# Patient Record
Sex: FEMALE | Race: WHITE | NOT HISPANIC OR LATINO | Employment: UNEMPLOYED | URBAN - NONMETROPOLITAN AREA
[De-identification: names, ages, dates, MRNs, and addresses within clinical notes are randomized per-mention and may not be internally consistent; named-entity substitution may affect disease eponyms.]

---

## 2018-04-23 ENCOUNTER — TRANSCRIBE ORDERS (OUTPATIENT)
Dept: CARDIOLOGY | Facility: CLINIC | Age: 68
End: 2018-04-23

## 2018-04-23 DIAGNOSIS — R06.00 DYSPNEA, UNSPECIFIED TYPE: ICD-10-CM

## 2018-04-23 DIAGNOSIS — R07.89 ATYPICAL CHEST PAIN: Primary | ICD-10-CM

## 2018-05-01 ENCOUNTER — CONSULT (OUTPATIENT)
Dept: CARDIOLOGY | Facility: CLINIC | Age: 68
End: 2018-05-01

## 2018-05-01 VITALS
BODY MASS INDEX: 28.16 KG/M2 | HEIGHT: 65 IN | HEART RATE: 60 BPM | SYSTOLIC BLOOD PRESSURE: 148 MMHG | WEIGHT: 169 LBS | DIASTOLIC BLOOD PRESSURE: 88 MMHG

## 2018-05-01 DIAGNOSIS — R07.89 CHEST TIGHTNESS: Primary | ICD-10-CM

## 2018-05-01 DIAGNOSIS — E78.00 HYPERCHOLESTEREMIA: ICD-10-CM

## 2018-05-01 DIAGNOSIS — I10 ESSENTIAL HYPERTENSION: ICD-10-CM

## 2018-05-01 DIAGNOSIS — E88.81 METABOLIC SYNDROME: ICD-10-CM

## 2018-05-01 DIAGNOSIS — R01.1 MURMUR, CARDIAC: ICD-10-CM

## 2018-05-01 DIAGNOSIS — R06.02 SHORTNESS OF BREATH: ICD-10-CM

## 2018-05-01 PROBLEM — E88.810 METABOLIC SYNDROME: Status: ACTIVE | Noted: 2018-05-01

## 2018-05-01 PROCEDURE — 99204 OFFICE O/P NEW MOD 45 MIN: CPT | Performed by: INTERNAL MEDICINE

## 2018-05-01 PROCEDURE — 93000 ELECTROCARDIOGRAM COMPLETE: CPT | Performed by: INTERNAL MEDICINE

## 2018-05-01 RX ORDER — MULTIPLE VITAMINS W/ MINERALS TAB 9MG-400MCG
1 TAB ORAL DAILY
COMMUNITY

## 2018-05-01 RX ORDER — RANITIDINE 300 MG/1
300 TABLET ORAL NIGHTLY
Qty: 30 TABLET | Refills: 8 | Status: SHIPPED | OUTPATIENT
Start: 2018-05-01 | End: 2018-11-29 | Stop reason: ALTCHOICE

## 2018-05-01 RX ORDER — ISOSORBIDE MONONITRATE 30 MG/1
30 TABLET, EXTENDED RELEASE ORAL DAILY
COMMUNITY
End: 2018-05-30 | Stop reason: ALTCHOICE

## 2018-05-01 RX ORDER — PRAVASTATIN SODIUM 80 MG/1
40 TABLET ORAL DAILY
COMMUNITY
End: 2018-11-29 | Stop reason: SINTOL

## 2018-05-01 RX ORDER — ATENOLOL 50 MG/1
50 TABLET ORAL DAILY
COMMUNITY
End: 2018-05-07 | Stop reason: ALTCHOICE

## 2018-05-01 RX ORDER — LISINOPRIL 10 MG/1
10 TABLET ORAL DAILY
COMMUNITY
End: 2019-05-30 | Stop reason: DRUGHIGH

## 2018-05-01 RX ORDER — HYDROCHLOROTHIAZIDE 25 MG/1
25 TABLET ORAL DAILY
COMMUNITY
End: 2018-05-30

## 2018-05-01 RX ORDER — ASPIRIN 81 MG/1
81 TABLET ORAL DAILY
COMMUNITY

## 2018-05-01 NOTE — PROGRESS NOTES
CARDIAC COMPLAINTS  chest pressure/discomfort and dyspnea      Subjective   Tracy Kamara is a 67 y.o. female came in today for a full initial cardiac evaluation.  She has history of hypertension, hypercholesterolemia who also has a strong family history of ischemic heart disease.  She has been having symptoms of chest tightness and shortness of breath for few years.  About 2 years ago she was hiking for about 2 miles when she started feeling nausea, headache and dizziness.  Apparently the symptoms were so severe that the EMS were called in.  Apparently the heart rate and blood pressure was normal.  She started feeling better so she didn't go to the emergency room.  She was seen at your office about 2 days later and workup at that time included carotid ultrasound which was normal.  She had another episode again when she was hiking and this time it was mostly chest tightness.  Now for the last few months she started noticing the chest tightness occurring on moderate exertion itself and during that time she also gets shortness of breath.  It is not associated with any diaphoresis.  It is not associated with any dizziness or loss of consciousness.  About 3 days ago Imdur was started and the symptoms did get better, but she is not able to tolerate the 30 mg secondary to the headache and takes only 15 mg a day.  Her lab work showed her cholesterol is still elevated at 224 with the LDL of 136.  She is not able to tolerate most of the statins other than protocol only at 40 mg.  She also has been gaining weight secondary to not able to exercise.  She used to be a smoker who quit many years ago.        Past Surgical History:   Procedure Laterality Date   • US CAROTID UNILATERAL  11/10/2015    @ [facility]. Normal Study       Current Outpatient Prescriptions   Medication Sig Dispense Refill   • aspirin 81 MG EC tablet Take 81 mg by mouth Daily.     • atenolol (TENORMIN) 50 MG tablet Take 50 mg by mouth Daily.     •  hydrochlorothiazide (HYDRODIURIL) 25 MG tablet Take 25 mg by mouth Daily.     • isosorbide mononitrate (IMDUR) 30 MG 24 hr tablet Take 30 mg by mouth Daily.     • lisinopril (PRINIVIL,ZESTRIL) 10 MG tablet Take 10 mg by mouth Daily.     • Multiple Vitamins-Minerals (MULTIVITAMIN WITH MINERALS) tablet tablet Take 1 tablet by mouth Daily.     • pravastatin (PRAVACHOL) 80 MG tablet Take 40 mg by mouth Daily.     • raNITIdine (ZANTAC) 300 MG tablet Take 1 tablet by mouth Every Night. 30 tablet 8     No current facility-administered medications for this visit.            ALLERGIES:  Tape    Past Medical History:   Diagnosis Date   • Asthma     Was told she has had a child   • History of hysterectomy    • Hx of cholecystectomy    • Hx of tonsillectomy    • Hyperlipidemia    • Hypertension        History   Smoking Status   • Former Smoker   • Quit date: 5/1/1992   Smokeless Tobacco   • Never Used          Family History   Problem Relation Age of Onset   • Hypertension Mother    • Diabetes Mother    • Heart attack Father    • Hyperlipidemia Father    • Hypertension Father        Review of Systems   Constitution: Positive for malaise/fatigue. Negative for decreased appetite.   HENT: Negative for congestion and sore throat.    Eyes: Negative for blurred vision.   Cardiovascular: Positive for chest pain and dyspnea on exertion.   Respiratory: Positive for shortness of breath. Negative for snoring.    Endocrine: Negative for cold intolerance and heat intolerance.   Hematologic/Lymphatic: Negative for adenopathy. Does not bruise/bleed easily.   Skin: Negative for itching, nail changes and skin cancer.   Musculoskeletal: Negative for arthritis and myalgias.   Gastrointestinal: Negative for abdominal pain, dysphagia and heartburn.   Genitourinary: Negative for bladder incontinence and frequency.   Neurological: Negative for dizziness, light-headedness, seizures and vertigo.   Psychiatric/Behavioral: Negative for altered mental  "status.   Allergic/Immunologic: Negative for environmental allergies and hives.       Diabetes- No  Thyroid- normal    Objective     /88 (BP Location: Right arm)   Pulse 60   Ht 165.1 cm (65\")   Wt 76.7 kg (169 lb)   BMI 28.12 kg/m²     Physical Exam   Constitutional: She is oriented to person, place, and time. She appears well-developed and well-nourished.   HENT:   Head: Normocephalic.   Nose: Nose normal.   Eyes: EOM are normal. Pupils are equal, round, and reactive to light.   Neck: Normal range of motion. Neck supple.   Cardiovascular: Normal rate, regular rhythm, S1 normal and S2 normal.    Murmur heard.  Pulmonary/Chest: Effort normal and breath sounds normal.   Abdominal: Soft. Bowel sounds are normal.   Musculoskeletal: Normal range of motion. She exhibits no edema.   Neurological: She is alert and oriented to person, place, and time.   Skin: Skin is warm and dry.   Psychiatric: She has a normal mood and affect.         ECG 12 Lead  Date/Time: 5/1/2018 1:00 PM  Performed by: CROW MOE  Authorized by: CROW MOE   Previous ECG: no previous ECG available  Rhythm: sinus rhythm  Rate: normal  QRS axis: normal  Clinical impression: non-specific ECG              Assessment/Plan     Tracy was seen today for establish care.    Diagnoses and all orders for this visit:    Chest tightness  -     Stress Test With Myocardial Perfusion One Day; Future  -     raNITIdine (ZANTAC) 300 MG tablet; Take 1 tablet by mouth Every Night.    Essential hypertension    Hypercholesteremia  -     Stress Test With Myocardial Perfusion One Day; Future    Shortness of breath  -     Adult Transthoracic Echo Complete W/ Cont if Necessary Per Protocol; Future    Metabolic syndrome    Murmur, cardiac  -     Adult Transthoracic Echo Complete W/ Cont if Necessary Per Protocol; Future       At baseline her heart rate is stable.  Blood pressure is slightly elevated.  Her BMI is around 28.  Her EKG showed normal " sinus rhythm with nonspecific ST-T changes.  Her clinical examination reveals slightly loud second heart sound and short systolic murmur at the mitral area.  Some of her symptoms does appears to be cardiac in nature.  I scheduled her to undergo a stress test to evaluate the functional status and also for ischemia.  I also scheduled her to undergo an echocardiogram to evaluate the LV function and the PA pressure.  She does have history of GERD and sometimes she feels the heart burn and the chest tightness were difficult to distinguish.  I started her on Zantac 300 milligram once a day for the GERD.  If her stress test shows evidence of ischemia, then she needs to undergo cardiac catheterization and I also advised her that she may need to be on additional medications to help reduce to cholesterol.  Continue her low dose of aspirin at this time.  Based on the results of these test further recommendations will be made.  I also talked to her about diet and weight reduction.  If the stress test and the LV function by echo cardiac murmur normal, need to consider bronchial asthma also.  She does have history of asthma in the past.             Electronically signed by Randall Molina MD May 1, 2018 1:00 PM

## 2018-05-01 NOTE — PROGRESS NOTES
Patient's Body mass index is 28.12 kg/m². BMI is above normal parameters. Follow-up plan includes:  exercise counseling and nutrition counseling.

## 2018-05-03 ENCOUNTER — HOSPITAL ENCOUNTER (OUTPATIENT)
Dept: CARDIOLOGY | Facility: HOSPITAL | Age: 68
Discharge: HOME | End: 2018-05-03
Attending: INTERNAL MEDICINE

## 2018-05-03 ENCOUNTER — OUTSIDE FACILITY SERVICE (OUTPATIENT)
Dept: CARDIOLOGY | Facility: CLINIC | Age: 68
End: 2018-05-03

## 2018-05-03 DIAGNOSIS — E78.00 HYPERCHOLESTEREMIA: ICD-10-CM

## 2018-05-03 DIAGNOSIS — R07.89 CHEST TIGHTNESS: ICD-10-CM

## 2018-05-03 DIAGNOSIS — R06.02 SHORTNESS OF BREATH: ICD-10-CM

## 2018-05-03 DIAGNOSIS — R01.1 MURMUR, CARDIAC: ICD-10-CM

## 2018-05-03 LAB
MAXIMAL PREDICTED HEART RATE: 151 BPM
MAXIMAL PREDICTED HEART RATE: 151 BPM
STRESS TARGET HR: 128 BPM
STRESS TARGET HR: 128 BPM

## 2018-05-03 PROCEDURE — 78452 HT MUSCLE IMAGE SPECT MULT: CPT

## 2018-05-03 PROCEDURE — 93306 TTE W/DOPPLER COMPLETE: CPT

## 2018-05-03 PROCEDURE — A9500 TC99M SESTAMIBI: HCPCS | Performed by: INTERNAL MEDICINE

## 2018-05-03 PROCEDURE — 93018 CV STRESS TEST I&R ONLY: CPT | Performed by: INTERNAL MEDICINE

## 2018-05-03 PROCEDURE — 0 TECHNETIUM SESTAMIBI: Performed by: INTERNAL MEDICINE

## 2018-05-03 PROCEDURE — 93306 TTE W/DOPPLER COMPLETE: CPT | Performed by: INTERNAL MEDICINE

## 2018-05-03 PROCEDURE — 78452 HT MUSCLE IMAGE SPECT MULT: CPT | Performed by: INTERNAL MEDICINE

## 2018-05-03 PROCEDURE — 93017 CV STRESS TEST TRACING ONLY: CPT

## 2018-05-03 RX ADMIN — TECHNETIUM TC 99M SESTAMIBI 1 DOSE: 1 INJECTION INTRAVENOUS at 14:15

## 2018-05-07 ENCOUNTER — TELEPHONE (OUTPATIENT)
Dept: CARDIOLOGY | Facility: CLINIC | Age: 68
End: 2018-05-07

## 2018-05-07 RX ORDER — CARVEDILOL 12.5 MG/1
12.5 TABLET ORAL 2 TIMES DAILY
Qty: 60 TABLET | Refills: 0 | Status: SHIPPED | OUTPATIENT
Start: 2018-05-07 | End: 2018-05-30 | Stop reason: SDUPTHER

## 2018-05-07 NOTE — TELEPHONE ENCOUNTER
Patient aware of stress test and echo results.  Change Atenolol to Coreg 12.5 mg BID.  Patient preferred to send a 30 day script to [facility] and at her 5/30/18 follow up, if doing well then will request a mail order script.

## 2018-05-30 ENCOUNTER — TELEPHONE (OUTPATIENT)
Dept: CARDIOLOGY | Facility: CLINIC | Age: 68
End: 2018-05-30

## 2018-05-30 ENCOUNTER — OFFICE VISIT (OUTPATIENT)
Dept: CARDIOLOGY | Facility: CLINIC | Age: 68
End: 2018-05-30

## 2018-05-30 VITALS
WEIGHT: 171.5 LBS | HEIGHT: 65 IN | DIASTOLIC BLOOD PRESSURE: 100 MMHG | SYSTOLIC BLOOD PRESSURE: 162 MMHG | BODY MASS INDEX: 28.57 KG/M2 | HEART RATE: 57 BPM

## 2018-05-30 DIAGNOSIS — E78.00 HYPERCHOLESTEREMIA: ICD-10-CM

## 2018-05-30 DIAGNOSIS — R07.89 CHEST TIGHTNESS: ICD-10-CM

## 2018-05-30 DIAGNOSIS — E88.81 METABOLIC SYNDROME: ICD-10-CM

## 2018-05-30 DIAGNOSIS — I10 ESSENTIAL HYPERTENSION: Primary | ICD-10-CM

## 2018-05-30 PROCEDURE — 99213 OFFICE O/P EST LOW 20 MIN: CPT | Performed by: NURSE PRACTITIONER

## 2018-05-30 RX ORDER — CARVEDILOL 12.5 MG/1
12.5 TABLET ORAL 2 TIMES DAILY
Qty: 180 TABLET | Refills: 2 | Status: SHIPPED | OUTPATIENT
Start: 2018-05-30 | End: 2018-11-29 | Stop reason: SDUPTHER

## 2018-06-01 ENCOUNTER — TELEPHONE (OUTPATIENT)
Dept: CARDIOLOGY | Facility: CLINIC | Age: 68
End: 2018-06-01

## 2018-06-01 NOTE — TELEPHONE ENCOUNTER
Patient called stating that you had advised her to monitor her BP and call with readings:    · 30th-   · PM:130/77 66  · 31st-  · AM: 158/88 59  · PM: 120/70 71  · 1st-  · AM: 146/86 62    States she takes all daily meds at 9 am and things that she has to take BID she takes evening dose at 9 pm. Any further recommendations?

## 2018-06-01 NOTE — TELEPHONE ENCOUNTER
BP better than what we had at [facility]- 160/100.  Continue the same for now and keep an eye on it.  If consistently elevated, then we will increase lisinopril to 10 mg BID.      Thanks

## 2018-11-29 ENCOUNTER — OFFICE VISIT (OUTPATIENT)
Dept: CARDIOLOGY | Facility: CLINIC | Age: 68
End: 2018-11-29

## 2018-11-29 VITALS
DIASTOLIC BLOOD PRESSURE: 68 MMHG | BODY MASS INDEX: 27.49 KG/M2 | SYSTOLIC BLOOD PRESSURE: 112 MMHG | HEIGHT: 65 IN | HEART RATE: 64 BPM | WEIGHT: 165 LBS

## 2018-11-29 DIAGNOSIS — E78.00 HYPERCHOLESTEREMIA: ICD-10-CM

## 2018-11-29 DIAGNOSIS — I10 ESSENTIAL HYPERTENSION: Primary | ICD-10-CM

## 2018-11-29 DIAGNOSIS — R01.1 MURMUR, CARDIAC: ICD-10-CM

## 2018-11-29 PROCEDURE — 99213 OFFICE O/P EST LOW 20 MIN: CPT | Performed by: NURSE PRACTITIONER

## 2018-11-29 RX ORDER — CARVEDILOL 12.5 MG/1
12.5 TABLET ORAL 2 TIMES DAILY
Qty: 180 TABLET | Refills: 3 | Status: SHIPPED | OUTPATIENT
Start: 2018-11-29 | End: 2019-11-26 | Stop reason: SDUPTHER

## 2019-05-30 ENCOUNTER — OFFICE VISIT (OUTPATIENT)
Dept: CARDIOLOGY | Facility: CLINIC | Age: 69
End: 2019-05-30

## 2019-05-30 VITALS
BODY MASS INDEX: 25.66 KG/M2 | HEIGHT: 65 IN | HEART RATE: 60 BPM | WEIGHT: 154 LBS | DIASTOLIC BLOOD PRESSURE: 80 MMHG | SYSTOLIC BLOOD PRESSURE: 124 MMHG

## 2019-05-30 DIAGNOSIS — E78.00 HYPERCHOLESTEREMIA: ICD-10-CM

## 2019-05-30 DIAGNOSIS — I10 ESSENTIAL HYPERTENSION: Primary | ICD-10-CM

## 2019-05-30 PROCEDURE — 99213 OFFICE O/P EST LOW 20 MIN: CPT | Performed by: NURSE PRACTITIONER

## 2019-05-30 RX ORDER — LISINOPRIL 5 MG/1
5 TABLET ORAL DAILY
Qty: 180 TABLET | Refills: 3 | Status: SHIPPED | OUTPATIENT
Start: 2019-05-30 | End: 2020-09-08 | Stop reason: SDUPTHER

## 2019-05-30 RX ORDER — PRAVASTATIN SODIUM 40 MG
20 TABLET ORAL DAILY
COMMUNITY
End: 2020-10-13 | Stop reason: ALTCHOICE

## 2019-05-30 NOTE — PROGRESS NOTES
Chief Complaint   Patient presents with   • Follow-up     Cardiac management. She reports at times B/P will be low, she will notice when she is feeling tired. She has lost weight, has changed diet to no sugars, breads and chips. Last labs per PCP around first of year.       Subjective       Tracy Kamara is a 68 y.o. female with a history of hypertension, hyperlipidemia, and strong family history of IHD.  She was referred in May 2018 for her initial cardiac evaluation secondary to CP and dizziness. Carotid US was normal. Stress and echo showed normal EF and no ischemia. She did have increased chronotropic response and atenolol changed to Coreg. She has done well. Lipids  in April 2018 were elevated with , , HDL 47, .  She is managed with pravastatin. CMP normal other than glucose 120, CBC normal TSH 2.07, BNP 25. She came today for follow up. She feels well.    She denies any cardiac symptoms.  She is watching her diet and has lost 9 more pounds.  She restricts sugars and starches.  She is hiking often.  Blood pressure has been a little low since she has lost weight.  At times systolic pressure is 80-90.  She plans to have labs done this August at the [facility].     HPI         Cardiac History:    Past Surgical History:   Procedure Laterality Date   • CARDIOVASCULAR STRESS TEST  05/03/2018    6 MIn, 30 Secs. 7.0 METS. 107% THR. BP- 149/70. Breast Attenuation   • ECHO - CONVERTED  05/03/2018    EF 65%. RVSP- 46 mmHg   • US CAROTID UNILATERAL  11/10/2015    @ [facility]. Normal Study       Current Outpatient Medications   Medication Sig Dispense Refill   • aspirin 81 MG EC tablet Take 81 mg by mouth Daily.     • carvedilol (COREG) 12.5 MG tablet Take 1 tablet by mouth 2 (Two) Times a Day. 180 tablet 3   • Multiple Vitamins-Minerals (MULTIVITAMIN WITH MINERALS) tablet tablet Take 1 tablet by mouth Daily.     • pravastatin (PRAVACHOL) 40 MG tablet Take 40 mg by mouth Daily.     • lisinopril  "(PRINIVIL,ZESTRIL) 5 MG tablet Take 1 tablet by mouth Daily. With extra 5 mg as needed for bp >140/90 180 tablet 3     No current facility-administered medications for this visit.        Tape    Past Medical History:   Diagnosis Date   • Asthma     Was told she has had a child   • Diverticulitis    • History of hysterectomy    • Hx of cholecystectomy    • Hx of tonsillectomy    • Hyperlipidemia    • Hypertension        Social History     Socioeconomic History   • Marital status:      Spouse name: Not on file   • Number of children: Not on file   • Years of education: Not on file   • Highest education level: Not on file   Tobacco Use   • Smoking status: Former Smoker     Last attempt to quit: 1992     Years since quittin.0   • Smokeless tobacco: Never Used   Substance and Sexual Activity   • Alcohol use: Yes     Comment: rarely, maybe once a year.   • Drug use: No       Family History   Problem Relation Age of Onset   • Hypertension Mother    • Diabetes Mother    • Heart attack Father    • Hyperlipidemia Father    • Hypertension Father        Review of Systems   Constitution: Positive for weight loss (Decreased 9 pounds in 6 months). Negative for decreased appetite, weakness (Only when BP is low) and malaise/fatigue.   HENT: Negative.    Eyes: Negative.    Cardiovascular: Negative for chest pain, dyspnea on exertion, leg swelling, palpitations and syncope.   Respiratory: Negative for cough and shortness of breath.    Endocrine: Negative.    Hematologic/Lymphatic: Negative.    Skin: Negative.    Musculoskeletal: Negative.    Gastrointestinal: Negative for abdominal pain.   Genitourinary: Negative for dysuria and hematuria.   Neurological: Negative for dizziness.   Psychiatric/Behavioral: Negative for altered mental status and depression.   Allergic/Immunologic: Negative.       Diabetes- No  Thyroid-normal    Objective     /80 (BP Location: Left arm)   Pulse 60   Ht 165.1 cm (65\")   Wt 69.9 kg " (154 lb)   BMI 25.63 kg/m²      Physical Exam   Constitutional: She is oriented to person, place, and time. She appears well-developed and well-nourished. No distress.   HENT:   Head: Normocephalic.   Eyes: Pupils are equal, round, and reactive to light.   Neck: Normal range of motion.   Cardiovascular: Normal rate, regular rhythm and intact distal pulses.   Pulmonary/Chest: Effort normal and breath sounds normal. No respiratory distress.   Abdominal: Soft. Bowel sounds are normal.   Musculoskeletal: Normal range of motion. She exhibits no edema.   Neurological: She is alert and oriented to person, place, and time.   Skin: Skin is warm and dry. She is not diaphoretic.   Psychiatric: She has a normal mood and affect.   Nursing note and vitals reviewed.     Procedures          Assessment/Plan    Heart rate and blood pressure are well controlled.  She has had considerable weight loss and was congratulated on her efforts to maintain a healthy lifestyle.  To prevent hypotension, she is advised to reduce lisinopril to 5 mg daily.  She can take an extra 5 if needed if blood pressure increases to more than 140/90.  She is tolerating pravastatin for hyperlipidemia.  She plans to have labs rechecked this August at the [facility]. Continue carvedilol for increased chronotropic response.  If she continues to exercise and lose weight, carvedilol may be reduced.  She denies cardiac symptoms, no new testing will be ordered.  She appears stable from a cardiac standpoint.  We will see her back in 6 months or sooner if needed.  Tracy was seen today for follow-up.    Diagnoses and all orders for this visit:    Essential hypertension  -     lisinopril (PRINIVIL,ZESTRIL) 5 MG tablet; Take 1 tablet by mouth Daily. With extra 5 mg as needed for bp >140/90    Hypercholesteremia        Patient's Body mass index is 25.63 kg/m². BMI is within normal parameters. No follow-up required..                   Electronically signed by Nya ASHLEY  JET Meyer,  May 31, 2019 12:13 PM

## 2019-11-26 ENCOUNTER — OFFICE VISIT (OUTPATIENT)
Dept: CARDIOLOGY | Facility: CLINIC | Age: 69
End: 2019-11-26

## 2019-11-26 VITALS
HEIGHT: 65 IN | WEIGHT: 152 LBS | BODY MASS INDEX: 25.33 KG/M2 | HEART RATE: 64 BPM | DIASTOLIC BLOOD PRESSURE: 82 MMHG | SYSTOLIC BLOOD PRESSURE: 130 MMHG

## 2019-11-26 DIAGNOSIS — I10 ESSENTIAL HYPERTENSION: Primary | ICD-10-CM

## 2019-11-26 DIAGNOSIS — E78.00 HYPERCHOLESTEREMIA: ICD-10-CM

## 2019-11-26 DIAGNOSIS — R07.89 CHEST TIGHTNESS: ICD-10-CM

## 2019-11-26 DIAGNOSIS — R06.02 SHORTNESS OF BREATH: ICD-10-CM

## 2019-11-26 PROCEDURE — 99213 OFFICE O/P EST LOW 20 MIN: CPT | Performed by: NURSE PRACTITIONER

## 2019-11-26 RX ORDER — CARVEDILOL 12.5 MG/1
12.5 TABLET ORAL 2 TIMES DAILY
Qty: 180 TABLET | Refills: 3 | Status: SHIPPED | OUTPATIENT
Start: 2019-11-26 | End: 2020-09-08 | Stop reason: SDUPTHER

## 2019-11-26 NOTE — PROGRESS NOTES
Chief Complaint   Patient presents with   • Follow-up     Cardiac management. Patient verbalized current medications.   • Blood pressure     She reports stable at home.   • Lab     Has copy of most recent labs.   • Shortness of Breath     She continues to have on exertion, relieved with rest.   • Med Refill     Needs refills on Coreg-90 day.       Subjective       Tracy Kamara is a 69 y.o. female with a history of hypertension, hyperlipidemia, and strong family history of IHD.  She was referred in May 2018 for her initial cardiac evaluation secondary to CP and dizziness. Carotid US was normal. Stress and echo showed normal EF and no ischemia. She did have increased chronotropic response and atenolol changed to Coreg. She has done well. Lipids in April 2018 were elevated with , , HDL 47, . She is managed with pravastatin which has been reduced to 20 mg secondary to myalgia. CMP normal other than glucose 120, CBC normal, TSH 2.07, BNP 25. She came today for follow up. She denies cardiac symptoms, no CP, SOB, or palpitations. She continues to watch her diet and has lost 2 more lb. She has had more problems with IBS/diverticulosis and has found raw vegetables are difficult for her to digest. We reduced lisinopril last visit secondary to lower bp. She is currently taking OTC sinus meds for URI symptoms. Labs brought in today showed LDL remains elevated at 132, HDL 50, Tri 194, , normal CMP, CBC, TSH 2.08.     HPI         Cardiac History:    Past Surgical History:   Procedure Laterality Date   • CARDIOVASCULAR STRESS TEST  05/03/2018    6 MIn, 30 Secs. 7.0 METS. 107% THR. BP- 149/70. Breast Attenuation   • ECHO - CONVERTED  05/03/2018    EF 65%. RVSP- 46 mmHg   • US CAROTID UNILATERAL  11/10/2015    @ [facility]. Normal Study       Current Outpatient Medications   Medication Sig Dispense Refill   • aspirin 81 MG EC tablet Take 81 mg by mouth Daily.     • carvedilol (COREG) 12.5 MG tablet Take 1 tablet  by mouth 2 (Two) Times a Day. 180 tablet 3   • lisinopril (PRINIVIL,ZESTRIL) 5 MG tablet Take 1 tablet by mouth Daily. With extra 5 mg as needed for bp >140/90 180 tablet 3   • Multiple Vitamins-Minerals (MULTIVITAMIN WITH MINERALS) tablet tablet Take 1 tablet by mouth Daily.     • pravastatin (PRAVACHOL) 40 MG tablet Take 20 mg by mouth Daily.       No current facility-administered medications for this visit.      Tape    Past Medical History:   Diagnosis Date   • Asthma     Was told she has had a child   • Diverticulitis    • History of hysterectomy    • Hx of cholecystectomy    • Hx of tonsillectomy    • Hyperlipidemia    • Hypertension      Social History     Socioeconomic History   • Marital status:      Spouse name: Not on file   • Number of children: Not on file   • Years of education: Not on file   • Highest education level: Not on file   Tobacco Use   • Smoking status: Former Smoker     Last attempt to quit: 1992     Years since quittin.5   • Smokeless tobacco: Never Used   Substance and Sexual Activity   • Alcohol use: Yes     Comment: rarely, maybe once a year.   • Drug use: No     Family History   Problem Relation Age of Onset   • Hypertension Mother    • Diabetes Mother    • Heart attack Father    • Hyperlipidemia Father    • Hypertension Father      Review of Systems   Constitution: Negative for decreased appetite, weakness and malaise/fatigue.   HENT: Positive for congestion.    Eyes: Negative.    Cardiovascular: Negative for chest pain, dyspnea on exertion, leg swelling, palpitations and syncope.   Respiratory: Negative for cough and shortness of breath.    Endocrine: Negative.    Skin: Negative.    Musculoskeletal: Negative for back pain and myalgias.   Gastrointestinal: Positive for bloating and diarrhea. Negative for melena.   Genitourinary: Negative for dysuria and hematuria.   Neurological: Negative for dizziness.   Psychiatric/Behavioral: Negative for altered mental status and  "depression.   Allergic/Immunologic: Negative.       Diabetes- No  Thyroid-normal    Objective     /82 (BP Location: Right arm)   Pulse 64   Ht 165.1 cm (65\")   Wt 68.9 kg (152 lb)   BMI 25.29 kg/m²     Physical Exam   Constitutional: She is oriented to person, place, and time. She appears well-developed and well-nourished. No distress.   HENT:   Head: Normocephalic and atraumatic.   Eyes: Pupils are equal, round, and reactive to light.   Neck: Normal range of motion.   Cardiovascular: Normal rate, regular rhythm and intact distal pulses.   Pulmonary/Chest: Breath sounds normal. No respiratory distress. She has no wheezes.   Abdominal: Soft. Bowel sounds are normal.   Musculoskeletal: Normal range of motion. She exhibits no edema.   Neurological: She is alert and oriented to person, place, and time.   Skin: Skin is warm and dry. She is not diaphoretic.   Psychiatric: She has a normal mood and affect.   Nursing note and vitals reviewed.     Procedures          Assessment/Plan      Tracy was seen today for follow-up, blood pressure, lab, shortness of breath and med refill.    Diagnoses and all orders for this visit:    Essential hypertension  -     carvedilol (COREG) 12.5 MG tablet; Take 1 tablet by mouth 2 (Two) Times a Day.    Hypercholesteremia    Shortness of breath    Chest tightness    Heart rate and blood pressure well controlled. Continue carvedilol and lisinopril. We reviewed her lab report.  CBC, CMP, and TSH are normal. LDL and triglycerides remain above goal. She is on maximally tolerated statin at 20 mg pravastatin. Ten year ASCVD risk calculated around 14%. We discussed alternatives for lowering cholesterol including addition of Zetia. She is going to check with her insurance for coverage. Limit saturated fat intake. Limit carbohydrates. Continue regular walking as she can tolerate. She has been treated for plantar fasciitis which has limited her hiking over the summer. Stress test and " echocardiogram reviewed showing no ischemia, normal LV function. She has no new cardiac symptoms. No new orders or testing today. She appears stable. We will see her back in six months or sooner if needed.      Patient's Body mass index is 25.29 kg/m². BMI is within normal parameters. No follow-up required..              Electronically signed by JET Marroquin,  November 28, 2019 6:59 AM

## 2020-07-30 ENCOUNTER — APPOINTMENT (OUTPATIENT)
Dept: WOMENS IMAGING | Facility: HOSPITAL | Age: 70
End: 2020-07-30

## 2020-07-30 PROCEDURE — 77067 SCR MAMMO BI INCL CAD: CPT | Performed by: RADIOLOGY

## 2020-07-30 PROCEDURE — 77063 BREAST TOMOSYNTHESIS BI: CPT | Performed by: RADIOLOGY

## 2020-09-08 ENCOUNTER — TELEPHONE (OUTPATIENT)
Dept: CARDIOLOGY | Facility: CLINIC | Age: 70
End: 2020-09-08

## 2020-09-08 DIAGNOSIS — I10 ESSENTIAL HYPERTENSION: ICD-10-CM

## 2020-09-08 RX ORDER — CARVEDILOL 12.5 MG/1
12.5 TABLET ORAL 2 TIMES DAILY
Qty: 180 TABLET | Refills: 0 | Status: SHIPPED | OUTPATIENT
Start: 2020-09-08 | End: 2020-09-08 | Stop reason: SDUPTHER

## 2020-09-08 RX ORDER — LISINOPRIL 5 MG/1
5 TABLET ORAL DAILY
Qty: 180 TABLET | Refills: 0 | Status: SHIPPED | OUTPATIENT
Start: 2020-09-08 | End: 2020-10-13 | Stop reason: SDUPTHER

## 2020-09-08 RX ORDER — CARVEDILOL 12.5 MG/1
12.5 TABLET ORAL 2 TIMES DAILY
Qty: 180 TABLET | Refills: 0 | Status: SHIPPED | OUTPATIENT
Start: 2020-09-08 | End: 2020-10-13 | Stop reason: SDUPTHER

## 2020-09-08 NOTE — TELEPHONE ENCOUNTER
Script for Coreg 12.5mg bid did not go to pharmacy. 90 day refill on coreg 12.5mg bid sent to pharmacy.

## 2020-09-08 NOTE — TELEPHONE ENCOUNTER
"Patient called stating \"I need refills on Coreg and Lisinopril to be [facility]\". 90 day on coreg 12.5mg bid and lisinopril 5mg daily sent to pharmacy.  "

## 2020-10-13 ENCOUNTER — OFFICE VISIT (OUTPATIENT)
Dept: CARDIOLOGY | Facility: CLINIC | Age: 70
End: 2020-10-13

## 2020-10-13 VITALS
DIASTOLIC BLOOD PRESSURE: 70 MMHG | WEIGHT: 155.4 LBS | SYSTOLIC BLOOD PRESSURE: 120 MMHG | TEMPERATURE: 98 F | HEART RATE: 64 BPM | HEIGHT: 65 IN | BODY MASS INDEX: 25.89 KG/M2

## 2020-10-13 DIAGNOSIS — I10 ESSENTIAL HYPERTENSION: ICD-10-CM

## 2020-10-13 DIAGNOSIS — R06.2 WHEEZE: ICD-10-CM

## 2020-10-13 DIAGNOSIS — R06.02 SHORTNESS OF BREATH: ICD-10-CM

## 2020-10-13 DIAGNOSIS — J30.89 SEASONAL ALLERGIC RHINITIS DUE TO OTHER ALLERGIC TRIGGER: ICD-10-CM

## 2020-10-13 DIAGNOSIS — E78.00 HYPERCHOLESTEREMIA: ICD-10-CM

## 2020-10-13 PROCEDURE — 99213 OFFICE O/P EST LOW 20 MIN: CPT | Performed by: NURSE PRACTITIONER

## 2020-10-13 RX ORDER — CARVEDILOL 12.5 MG/1
12.5 TABLET ORAL 2 TIMES DAILY
Qty: 180 TABLET | Refills: 3 | Status: SHIPPED | OUTPATIENT
Start: 2020-10-13 | End: 2021-04-13 | Stop reason: SDUPTHER

## 2020-10-13 RX ORDER — LISINOPRIL 5 MG/1
5 TABLET ORAL DAILY
Qty: 180 TABLET | Refills: 3 | Status: SHIPPED | OUTPATIENT
Start: 2020-10-13 | End: 2021-04-13 | Stop reason: SDUPTHER

## 2020-10-13 RX ORDER — ALBUTEROL SULFATE 90 UG/1
2 AEROSOL, METERED RESPIRATORY (INHALATION) EVERY 4 HOURS PRN
Qty: 8 G | Refills: 6 | Status: SHIPPED | OUTPATIENT
Start: 2020-10-13 | End: 2023-01-19

## 2020-10-13 NOTE — PROGRESS NOTES
Chief Complaint   Patient presents with   • Follow-up     Cardiac management.   • Lab     Last labs in chart. Has not took Pravastatin for the last 2 months due to needing labs, to see PCP soon for labs and refills.   • Shortness of Breath     Has in the fall, relates to allergies.   • Med Refill     Needs refills on cardiac medications-90 day.       Subjective       Tracy Kamara is a 70 y.o. female with hypertension, hyperlipidemia and strong family history of IHD.She was referred in May 2018 for her initial cardiac evaluation secondary to CP and dizziness. Carotid US was normal. Stress and echo showed normal EF and no ischemia. She did have increased chronotropic response and atenolol changed to Coreg. She has done well.  She developed myalgia with higher dose pravastatin which was reduced to 20 mg.  In 2019, , HDL 50.    She returns today for regular follow-up.  She denies cardiac symptoms, no chest pain or dyspnea on exertion.  She remains active with occasional hiking and has been kayaking this summer.  She continues to have sinus drainage, allergy symptoms.  She does note wheezing often at night.  She has occasional nonproductive cough.  Blood pressure has been well controlled.  She plans to see Dr. Enamorado soon for labs.         Cardiac History:    Past Surgical History:   Procedure Laterality Date   • CARDIOVASCULAR STRESS TEST  05/03/2018    6 MIn, 30 Secs. 7.0 METS. 107% THR. BP- 149/70. Breast Attenuation   • ECHO - CONVERTED  05/03/2018    EF 65%. RVSP- 46 mmHg   • US CAROTID UNILATERAL  11/10/2015    @ [facility]. Normal Study     Current Outpatient Medications   Medication Sig Dispense Refill   • aspirin 81 MG EC tablet Take 81 mg by mouth Daily.     • carvedilol (COREG) 12.5 MG tablet Take 1 tablet by mouth 2 (Two) Times a Day. 180 tablet 3   • lisinopril (PRINIVIL,ZESTRIL) 5 MG tablet Take 1 tablet by mouth Daily. With extra 5 mg as needed for bp >140/90 180 tablet 3   • Multiple Vitamins-Minerals  (MULTIVITAMIN WITH MINERALS) tablet tablet Take 1 tablet by mouth Daily.     • albuterol sulfate  (90 Base) MCG/ACT inhaler Inhale 2 puffs Every 4 (Four) Hours As Needed for Wheezing. 8 g 6     No current facility-administered medications for this visit.      Tape    Past Medical History:   Diagnosis Date   • Asthma     Was told she has had a child   • Diverticulitis    • History of hysterectomy    • Hx of cholecystectomy    • Hx of tonsillectomy    • Hyperlipidemia    • Hypertension      Social History     Socioeconomic History   • Marital status:      Spouse name: Not on file   • Number of children: Not on file   • Years of education: Not on file   • Highest education level: Not on file   Tobacco Use   • Smoking status: Former Smoker     Quit date: 1992     Years since quittin.4   • Smokeless tobacco: Never Used   Substance and Sexual Activity   • Alcohol use: Not Currently     Comment: rarely, maybe once a year.   • Drug use: No     Family History   Problem Relation Age of Onset   • Hypertension Mother    • Diabetes Mother    • Heart attack Father    • Hyperlipidemia Father    • Hypertension Father      Review of Systems   Constitution: Negative for decreased appetite and malaise/fatigue.   HENT: Positive for congestion.    Eyes: Negative for blurred vision.   Cardiovascular: Negative for chest pain, dyspnea on exertion, leg swelling, palpitations and syncope.   Respiratory: Positive for cough and wheezing. Negative for shortness of breath and sleep disturbances due to breathing.    Endocrine: Negative.    Hematologic/Lymphatic: Negative for bleeding problem. Does not bruise/bleed easily.   Skin: Negative.    Musculoskeletal: Negative for falls and myalgias.   Gastrointestinal: Negative for abdominal pain, heartburn and melena.   Genitourinary: Negative for hematuria.   Neurological: Negative for dizziness and light-headedness.   Psychiatric/Behavioral: Negative for altered mental status.  "  Allergic/Immunologic: Negative.       Objective     /70 (BP Location: Right arm)   Pulse 64   Temp 98 °F (36.7 °C)   Ht 165.1 cm (65\")   Wt 70.5 kg (155 lb 6.4 oz)   BMI 25.86 kg/m²     Vitals signs and nursing note reviewed.   Constitutional:       General: Not in acute distress.     Appearance: Well-developed. Not diaphoretic.   Eyes:      Pupils: Pupils are equal, round, and reactive to light.   HENT:      Head: Normocephalic.   Neck:      Musculoskeletal: Normal range of motion.   Pulmonary:      Effort: Pulmonary effort is normal. No respiratory distress.      Breath sounds: Normal breath sounds.   Cardiovascular:      Normal rate. Regular rhythm.   Pulses:     Intact distal pulses.   Abdominal:      General: Bowel sounds are normal.      Palpations: Abdomen is soft.   Musculoskeletal: Normal range of motion.   Skin:     General: Skin is warm and dry.   Neurological:      Mental Status: Alert and oriented to person, place, and time.        Procedures          Problem List Items Addressed This Visit        Cardiovascular and Mediastinum    Essential hypertension    Relevant Medications    lisinopril (PRINIVIL,ZESTRIL) 5 MG tablet    carvedilol (COREG) 12.5 MG tablet    Hypercholesteremia       Respiratory    Shortness of breath    Seasonal allergic rhinitis    Wheeze         1.  HTN-well-controlled with lisinopril and carvedilol.  Continue the same.  Limit sodium.  If the cough and wheeze persist, we may consider changing her antihypertensives to cardioselective beta-blockade as well as changing ACE to ARB.  Patient does feel these are outdoor allergens.    2.  Wheeze- no wheezing at present, patient had recording of nighttime wheezing for which she was given albuterol inhaler to use as needed.  She is advised to discuss this with you.  Flonase and antihistamine OTC is encouraged.    3.  Hyperlipidemia-she is sensitive to statin and has been off pravastatin for the last couple of months.  She plans " to follow-up with you soon for repeat labs.  She is encouraged to take low-dose pravastatin for elevated LDL and strong family history.    We reviewed cardiac work-up which showed no ischemia and normal LV function.  With elevated PA pressure and intermittent wheezing, may need further pulmonary work-up including PFT, +/-CT chest (normal CXR in 2018), sleep apnea work-up.    Patient's Body mass index is 25.86 kg/m². BMI is within normal parameters. No follow-up required..               Electronically signed by JET Marroquin,  October 15, 2020 11:31 EDT

## 2020-10-15 PROBLEM — R06.2 WHEEZE: Status: ACTIVE | Noted: 2020-10-15

## 2020-10-15 PROBLEM — J30.2 SEASONAL ALLERGIC RHINITIS: Status: ACTIVE | Noted: 2020-10-15

## 2021-04-13 ENCOUNTER — OFFICE VISIT (OUTPATIENT)
Dept: CARDIOLOGY | Facility: CLINIC | Age: 71
End: 2021-04-13

## 2021-04-13 VITALS
WEIGHT: 156.6 LBS | HEART RATE: 60 BPM | DIASTOLIC BLOOD PRESSURE: 70 MMHG | SYSTOLIC BLOOD PRESSURE: 124 MMHG | HEIGHT: 65 IN | BODY MASS INDEX: 26.09 KG/M2 | TEMPERATURE: 98.4 F

## 2021-04-13 DIAGNOSIS — I10 ESSENTIAL HYPERTENSION: Primary | ICD-10-CM

## 2021-04-13 DIAGNOSIS — M79.605 PAIN OF LEFT LOWER EXTREMITY: ICD-10-CM

## 2021-04-13 DIAGNOSIS — E78.00 HYPERCHOLESTEREMIA: ICD-10-CM

## 2021-04-13 DIAGNOSIS — R79.89 ELEVATED D-DIMER: ICD-10-CM

## 2021-04-13 DIAGNOSIS — R60.0 LEG EDEMA, LEFT: ICD-10-CM

## 2021-04-13 PROCEDURE — 99214 OFFICE O/P EST MOD 30 MIN: CPT | Performed by: NURSE PRACTITIONER

## 2021-04-13 RX ORDER — LISINOPRIL 5 MG/1
5 TABLET ORAL DAILY
Qty: 180 TABLET | Refills: 3 | Status: SHIPPED | OUTPATIENT
Start: 2021-04-13 | End: 2022-01-26 | Stop reason: ALTCHOICE

## 2021-04-13 RX ORDER — CARVEDILOL 12.5 MG/1
12.5 TABLET ORAL 2 TIMES DAILY
Qty: 180 TABLET | Refills: 3 | Status: SHIPPED | OUTPATIENT
Start: 2021-04-13 | End: 2021-10-28 | Stop reason: SDUPTHER

## 2021-04-13 NOTE — PROGRESS NOTES
Chief Complaint   Patient presents with   • Follow-up     Cardiac management   • Lab     No current labs. Had last covid vaccine on 3/10/21.   • Edema     Having swelling in left leg for the last couple days, noticed pain in left leg today.    • Shortness of Breath     She relates to seasonal allergies.   • Med Refill     Needs refills on Coreg and Lisinopril-90 day.      Subjective       Tracy Kamara is a 70 y.o. female with hypertension, hyperlipidemia and strong family history of IHD.She was referred in May 2018 for her initial cardiac evaluation secondary to CP and dizziness. Carotid US was normal. Stress and echo showed normal EF and no ischemia. She did have increased chronotropic response and atenolol changed to Coreg. She has done well.  She developed myalgia with higher dose pravastatin which was reduced to 20 mg.  In 2019, , HDL 50. She later stopped statin secondary to myalgia.    She returns today for regular follow up. She denies cardiac symptoms. No chest pain, dyspnea on exertion, dizziness, palpitations. She continues to have allergy symptoms but not any worse than usual. Her cc today is left lower extremity swelling and discomfort. Approximately 2-3 days ago, she noticed mild increase in LLE size and indention from her sock. Today her leg feels a little heavy and tight, but not painful. No redness, pitting edema, or heat noted. She denies recent travel, prolonged sitting, surgery or illness. She completed 2 shot series Covid vaccine on 3/10/21. She is looking forward to being more active this spring/summer. She is planning to go camping and hiking this weekend.         Cardiac History:    Past Surgical History:   Procedure Laterality Date   • CARDIOVASCULAR STRESS TEST  05/03/2018    6 MIn, 30 Secs. 7.0 METS. 107% THR. BP- 149/70. Breast Attenuation   • ECHO - CONVERTED  05/03/2018    EF 65%. RVSP- 46 mmHg   • US CAROTID UNILATERAL  11/10/2015    @ [facility]. Normal Study     Current Outpatient  Medications   Medication Sig Dispense Refill   • albuterol sulfate  (90 Base) MCG/ACT inhaler Inhale 2 puffs Every 4 (Four) Hours As Needed for Wheezing. 8 g 6   • aspirin 81 MG EC tablet Take 81 mg by mouth Daily.     • carvedilol (COREG) 12.5 MG tablet Take 1 tablet by mouth 2 (Two) Times a Day. 180 tablet 3   • lisinopril (PRINIVIL,ZESTRIL) 5 MG tablet Take 1 tablet by mouth Daily. With extra 5 mg as needed for bp >140/90 180 tablet 3   • Multiple Vitamins-Minerals (MULTIVITAMIN WITH MINERALS) tablet tablet Take 1 tablet by mouth Daily.       No current facility-administered medications for this visit.     Tape    Past Medical History:   Diagnosis Date   • Asthma     Was told she has had a child   • Diverticulitis    • History of hysterectomy    • Hx of cholecystectomy    • Hx of tonsillectomy    • Hyperlipidemia    • Hypertension      Social History     Socioeconomic History   • Marital status:      Spouse name: Not on file   • Number of children: Not on file   • Years of education: Not on file   • Highest education level: Not on file   Tobacco Use   • Smoking status: Former Smoker     Quit date: 1992     Years since quittin.9   • Smokeless tobacco: Never Used   Vaping Use   • Vaping Use: Never used   Substance and Sexual Activity   • Alcohol use: Not Currently     Comment: rarely, maybe once a year.   • Drug use: No     Family History   Problem Relation Age of Onset   • Hypertension Mother    • Diabetes Mother    • Heart attack Father    • Hyperlipidemia Father    • Hypertension Father      Review of Systems   Constitutional: Positive for weight gain (up 1 lb). Negative for decreased appetite and malaise/fatigue.   HENT: Negative.    Eyes: Negative for blurred vision.   Cardiovascular: Positive for leg swelling. Negative for chest pain, dyspnea on exertion, palpitations and syncope.   Respiratory: Negative for shortness of breath and sleep disturbances due to breathing.    Endocrine:  "Negative.    Hematologic/Lymphatic: Negative for bleeding problem. Does not bruise/bleed easily.   Skin: Negative.    Musculoskeletal: Negative for falls and myalgias.   Gastrointestinal: Negative for abdominal pain, heartburn and melena.   Genitourinary: Negative for hematuria.   Neurological: Negative for dizziness and light-headedness.   Psychiatric/Behavioral: Negative for altered mental status.   Allergic/Immunologic: Positive for environmental allergies.      Objective     /70 (BP Location: Right arm)   Pulse 60   Temp 98.4 °F (36.9 °C)   Ht 165.1 cm (65\")   Wt 71 kg (156 lb 9.6 oz)   BMI 26.06 kg/m²     Vitals and nursing note reviewed.   Constitutional:       General: Not in acute distress.     Appearance: Well-developed. Not diaphoretic.   Eyes:      Pupils: Pupils are equal, round, and reactive to light.   HENT:      Head: Normocephalic.   Pulmonary:      Effort: Pulmonary effort is normal. No respiratory distress.      Breath sounds: Normal breath sounds.   Cardiovascular:      Normal rate. Regular rhythm.      Murmurs: There is a grade 1 to 2/6 systolic murmur at the apex.   Pulses:     Intact distal pulses.   Edema:     Peripheral edema present.     Pretibial: trace edema of the left pretibial area.     Ankle: trace edema of the left ankle.  Abdominal:      General: Bowel sounds are normal.      Palpations: Abdomen is soft.   Musculoskeletal: Normal range of motion.      Cervical back: Normal range of motion. Skin:     General: Skin is warm and dry.   Neurological:      Mental Status: Alert and oriented to person, place, and time.        Procedures          Problem List Items Addressed This Visit        Cardiac and Vasculature    Essential hypertension - Primary    Relevant Medications    lisinopril (PRINIVIL,ZESTRIL) 5 MG tablet    carvedilol (COREG) 12.5 MG tablet    Other Relevant Orders    CBC & Differential    Comprehensive Metabolic Panel    TSH    Hypercholesteremia    Relevant Orders "    CBC & Differential    Comprehensive Metabolic Panel    Lipid Panel    TSH      Other Visit Diagnoses     Leg edema, left        Relevant Orders    CBC & Differential    Comprehensive Metabolic Panel    D-dimer, Quantitative         1. Unilateral LLE swelling- she is low risk for DVT(Wells criteria 1) without acute signs, but obvious increase in LE size compared to RLE. Will check d-dimer. If elevated, US will be ordered. I have asked her to complete this before her camping trip.     2. HTN- well controlled with lisinopril and carvedilol. Continue the same. Refills sent. BMI near normal.     3. Hyperlipidemia- she is statin intolerant. Will recheck her lipids. If significantly elevated, she will be encouraged to resume pravastatin vs Crestor 2 times weekly or if unable to tolerate may try Zetia. She has very strong family history. Her brother recently passed unexpectedly at 81.     4. SOB/wheezing- improved. LCTA. Continue to monitor.     She was given a lab order for CBC, CMP, TSH, F. Lipid, D-dimer. Copy will be forwarded when available. Follow up in six months or sooner as needed.     Patient's Body mass index is 26.06 kg/m². BMI is above normal parameters. Recommendations include: nutrition counseling.              Electronically signed by JET Marroquin,  April 14, 2021 06:15 EDT

## 2021-04-14 ENCOUNTER — TELEPHONE (OUTPATIENT)
Dept: CARDIOLOGY | Facility: CLINIC | Age: 71
End: 2021-04-14

## 2021-04-14 DIAGNOSIS — R79.89 ELEVATED D-DIMER: ICD-10-CM

## 2021-04-14 DIAGNOSIS — M79.605 PAIN OF LEFT LOWER EXTREMITY: ICD-10-CM

## 2021-04-14 DIAGNOSIS — I10 ESSENTIAL HYPERTENSION: ICD-10-CM

## 2021-04-14 DIAGNOSIS — E78.00 HYPERCHOLESTEREMIA: ICD-10-CM

## 2021-04-14 DIAGNOSIS — R60.0 LEG EDEMA, LEFT: Primary | ICD-10-CM

## 2021-04-14 NOTE — TELEPHONE ENCOUNTER
Attempted to notify patient of recommendation for LLE US to rule out DVT, unable to reach and unable to leave message due to no voice mail set up.

## 2021-04-14 NOTE — TELEPHONE ENCOUNTER
Patient made aware of recommendation for venous US of LLE to rule out DVT, patient verbalized understanding. Venous US scheduled at [facility] today at 1:30pm, patient aware.

## 2021-04-20 RX ORDER — PRAVASTATIN SODIUM 40 MG
40 TABLET ORAL DAILY
Qty: 90 TABLET | Refills: 3 | Status: SHIPPED | OUTPATIENT
Start: 2021-04-20 | End: 2023-01-19

## 2021-04-27 ENCOUNTER — TELEPHONE (OUTPATIENT)
Dept: CARDIOLOGY | Facility: CLINIC | Age: 71
End: 2021-04-27

## 2021-10-28 ENCOUNTER — OFFICE VISIT (OUTPATIENT)
Dept: CARDIOLOGY | Facility: CLINIC | Age: 71
End: 2021-10-28

## 2021-10-28 VITALS
WEIGHT: 154.6 LBS | SYSTOLIC BLOOD PRESSURE: 122 MMHG | BODY MASS INDEX: 25.76 KG/M2 | TEMPERATURE: 97.8 F | DIASTOLIC BLOOD PRESSURE: 70 MMHG | HEART RATE: 64 BPM | HEIGHT: 65 IN

## 2021-10-28 DIAGNOSIS — I10 ESSENTIAL HYPERTENSION: Primary | ICD-10-CM

## 2021-10-28 DIAGNOSIS — E78.00 HYPERCHOLESTEREMIA: ICD-10-CM

## 2021-10-28 DIAGNOSIS — R06.02 SHORTNESS OF BREATH: ICD-10-CM

## 2021-10-28 PROCEDURE — 99213 OFFICE O/P EST LOW 20 MIN: CPT | Performed by: NURSE PRACTITIONER

## 2021-10-28 RX ORDER — CARVEDILOL 12.5 MG/1
12.5 TABLET ORAL 2 TIMES DAILY
Qty: 180 TABLET | Refills: 4 | Status: SHIPPED | OUTPATIENT
Start: 2021-10-28 | End: 2022-01-26 | Stop reason: ALTCHOICE

## 2021-10-28 NOTE — PROGRESS NOTES
Chief Complaint   Patient presents with   • Follow-up     Cardiac management   • Lab     Last labs 2 weeks ago per PCP.   • Blood pressure     Stable at home.   • Med Refill     Needs refills on Coreg-90 day.     Subjective       Tracy Kamara is a 71 y.o. female with hypertension, hyperlipidemia and strong family history of IHD. She was referred in May 2018 for her initial cardiac evaluation secondary to CP and dizziness. Carotid US was normal. Stress and echo showed normal EF and no ischemia.  Atenolol changed to Coreg secondary to increased chronotropic response.  She developed myalgia with higher dose pravastatin which was reduced to 20 mg then later she stopped.  Labs 4/14/2021 showed BUNs/CR 15/0.95, glucose 106, NA/K1 46/4.9, normal LFT, cholesterol increased to 262, , HDL 51, .  D-dimer checked for left lower extremity edema was elevated at 2.03.  CBC normal.  Venous Doppler ultrasound showed no DVT but the presence of a popliteal cyst managed conservatively.  She agreed to rechallenge with pravastatin 40 mg.    She returns today for regular follow-up.  She feels well without cardiac complaints.  Denies chest pain, shortness of breath, palpitations, dizziness.  She remains active.  She has tolerated pravastatin.  She spends a lot of time outdoors, camping.  She remains active.  She is seen primary care and urology for bladder problems.       Cardiac History:    Past Surgical History:   Procedure Laterality Date   • CARDIOVASCULAR STRESS TEST  05/03/2018    6 MIn, 30 Secs. 7.0 METS. 107% THR. BP- 149/70. Breast Attenuation   • ECHO - CONVERTED  05/03/2018    EF 65%. RVSP- 46 mmHg   • US CAROTID UNILATERAL  11/10/2015    @ [facility]. Normal Study     Current Outpatient Medications   Medication Sig Dispense Refill   • albuterol sulfate  (90 Base) MCG/ACT inhaler Inhale 2 puffs Every 4 (Four) Hours As Needed for Wheezing. 8 g 6   • aspirin 81 MG EC tablet Take 81 mg by mouth Daily.     •  carvedilol (COREG) 12.5 MG tablet Take 1 tablet by mouth 2 (Two) Times a Day. 180 tablet 4   • lisinopril (PRINIVIL,ZESTRIL) 5 MG tablet Take 1 tablet by mouth Daily. With extra 5 mg as needed for bp >140/90 180 tablet 3   • Multiple Vitamins-Minerals (MULTIVITAMIN WITH MINERALS) tablet tablet Take 1 tablet by mouth Daily.     • pravastatin (Pravachol) 40 MG tablet Take 1 tablet by mouth Daily. 90 tablet 3     No current facility-administered medications for this visit.     Tape    Past Medical History:   Diagnosis Date   • Asthma     Was told she has had a child   • Diverticulitis    • History of hysterectomy    • Hx of cholecystectomy    • Hx of tonsillectomy    • Hyperlipidemia    • Hypertension      Social History     Socioeconomic History   • Marital status:    Tobacco Use   • Smoking status: Former Smoker     Quit date: 1992     Years since quittin.5   • Smokeless tobacco: Never Used   Vaping Use   • Vaping Use: Never used   Substance and Sexual Activity   • Alcohol use: Not Currently     Comment: rarely, maybe once a year.   • Drug use: No     Family History   Problem Relation Age of Onset   • Hypertension Mother    • Diabetes Mother    • Heart attack Father    • Hyperlipidemia Father    • Hypertension Father      Review of Systems   Constitutional: Positive for weight loss (Down 2 pounds). Negative for decreased appetite and malaise/fatigue.   HENT: Negative.    Eyes: Negative for blurred vision.   Cardiovascular: Negative for chest pain, dyspnea on exertion, leg swelling, palpitations and syncope.   Respiratory: Negative for shortness of breath and sleep disturbances due to breathing.    Endocrine: Negative.    Hematologic/Lymphatic: Negative for bleeding problem. Does not bruise/bleed easily.   Skin: Negative.    Musculoskeletal: Negative for falls and myalgias.   Gastrointestinal: Negative for abdominal pain, heartburn and melena.   Genitourinary: Negative for hematuria.   Neurological:  "Negative for dizziness and light-headedness.   Psychiatric/Behavioral: Negative for altered mental status.   Allergic/Immunologic: Negative.       Objective     /70 (BP Location: Right arm)   Pulse 64   Temp 97.8 °F (36.6 °C)   Ht 165.1 cm (65\")   Wt 70.1 kg (154 lb 9.6 oz)   BMI 25.73 kg/m²     Vitals and nursing note reviewed.   Constitutional:       General: Not in acute distress.     Appearance: Well-developed. Not diaphoretic.   Eyes:      Pupils: Pupils are equal, round, and reactive to light.   HENT:      Head: Normocephalic.   Pulmonary:      Effort: Pulmonary effort is normal. No respiratory distress.      Breath sounds: Normal breath sounds.   Cardiovascular:      Normal rate. Regular rhythm.   Pulses:     Intact distal pulses.   Edema:     Pretibial: trace edema of the left pretibial area.  Abdominal:      General: Bowel sounds are normal.      Palpations: Abdomen is soft.   Musculoskeletal: Normal range of motion.      Cervical back: Normal range of motion. Skin:     General: Skin is warm and dry.   Neurological:      Mental Status: Alert and oriented to person, place, and time.        Procedures          Problem List Items Addressed This Visit        Cardiac and Vasculature    Essential hypertension - Primary    Relevant Medications    carvedilol (COREG) 12.5 MG tablet    Other Relevant Orders    CBC & Differential    Comprehensive Metabolic Panel    Lipid Panel    TSH    Hypercholesteremia    Relevant Orders    CBC & Differential    Comprehensive Metabolic Panel    Lipid Panel    TSH       Pulmonary and Pneumonias    Shortness of breath    Relevant Orders    CBC & Differential    Comprehensive Metabolic Panel    TSH         1.  HTN-well controlled with lisinopril and carvedilol.  Continue same plan.  Refill sent.    2.  Hyperlipidemia-pravastatin 40 mg restarted for .  She is tolerating well.  She was given a lab order to recheck fasting lipid panel.  Continue heart healthy " diet.    3.  L LE swelling-improved, presence of Baker's cyst noted.    She appears stable.  No changes made.  She preferred to return yearly unless new symptoms develop.    Patient's Body mass index is 25.73 kg/m². indicating that she is within normal range (BMI 18.5-24.9). No BMI management plan needed.               Electronically signed by JET Marroquin,  October 28, 2021 14:44 EDT

## 2022-01-26 ENCOUNTER — TELEPHONE (OUTPATIENT)
Dept: CARDIOLOGY | Facility: CLINIC | Age: 72
End: 2022-01-26

## 2022-01-26 RX ORDER — LOSARTAN POTASSIUM 25 MG/1
25 TABLET ORAL DAILY
Qty: 90 TABLET | Refills: 3 | Status: SHIPPED | OUTPATIENT
Start: 2022-01-26 | End: 2023-01-19 | Stop reason: SDUPTHER

## 2022-01-26 RX ORDER — METOPROLOL SUCCINATE 50 MG/1
50 TABLET, EXTENDED RELEASE ORAL DAILY
Qty: 90 TABLET | Refills: 3 | Status: SHIPPED | OUTPATIENT
Start: 2022-01-26 | End: 2023-01-19 | Stop reason: SDUPTHER

## 2022-01-26 NOTE — TELEPHONE ENCOUNTER
Yes, okay. We have discussed this before.     Stop Coreg 12.5 mg BID, stop lisinopril 5 mg.     Start Toprol XL 50 mg once daily, losartan 25 mg once daily. If bp remains elevated, will titrate dose of losartan.     I will send new scripts to [facility].     If cough and wheeze do not improve, recommend CXR and PFT

## 2022-01-26 NOTE — TELEPHONE ENCOUNTER
Patient made aware of recommendations to stop coreg 12.5 mg bid and stop Lisinopril 5 mg. Start toprol xl 50 mg once daily, and start losartan 25 mg once daily. Patient verbalized understanding and reports will speak with PCP if coughing does not improve for chest xray. Patient reports will continue current meds until she receive toprol and losartan in mail.

## 2022-01-26 NOTE — TELEPHONE ENCOUNTER
Patient called with concerns of medications causing cough and wheeze for some time. She reports daughter feels coughing and wheeze is getting worse.    B/P averages 120s/70s, today 148/90 yet she has been cleaning house prior to taking B/P.    Medications  Coreg 12.5mg bid  Lisinopril 5 mg daily with 5 mg as needed for B/P >140/90

## 2023-01-19 ENCOUNTER — OFFICE VISIT (OUTPATIENT)
Dept: CARDIOLOGY | Facility: CLINIC | Age: 73
End: 2023-01-19
Payer: MEDICARE

## 2023-01-19 VITALS
DIASTOLIC BLOOD PRESSURE: 80 MMHG | SYSTOLIC BLOOD PRESSURE: 140 MMHG | HEIGHT: 65 IN | WEIGHT: 155.2 LBS | BODY MASS INDEX: 25.86 KG/M2 | HEART RATE: 60 BPM

## 2023-01-19 DIAGNOSIS — R06.02 SHORTNESS OF BREATH: ICD-10-CM

## 2023-01-19 DIAGNOSIS — E78.00 HYPERCHOLESTEREMIA: ICD-10-CM

## 2023-01-19 DIAGNOSIS — I27.20 PULMONARY HYPERTENSION: ICD-10-CM

## 2023-01-19 DIAGNOSIS — I34.0 NONRHEUMATIC MITRAL VALVE REGURGITATION: ICD-10-CM

## 2023-01-19 DIAGNOSIS — I10 ESSENTIAL HYPERTENSION: Primary | ICD-10-CM

## 2023-01-19 PROCEDURE — 99214 OFFICE O/P EST MOD 30 MIN: CPT | Performed by: NURSE PRACTITIONER

## 2023-01-19 RX ORDER — EZETIMIBE 10 MG/1
10 TABLET ORAL DAILY
Qty: 90 TABLET | Refills: 4 | Status: SHIPPED | OUTPATIENT
Start: 2023-01-19

## 2023-01-19 RX ORDER — METOPROLOL SUCCINATE 50 MG/1
50 TABLET, EXTENDED RELEASE ORAL DAILY
Qty: 90 TABLET | Refills: 4 | Status: SHIPPED | OUTPATIENT
Start: 2023-01-19 | End: 2023-01-26 | Stop reason: SDUPTHER

## 2023-01-19 RX ORDER — LOSARTAN POTASSIUM 25 MG/1
25 TABLET ORAL DAILY
Qty: 90 TABLET | Refills: 4 | Status: SHIPPED | OUTPATIENT
Start: 2023-01-19

## 2023-01-19 NOTE — LETTER
January 23, 2023     Herman Enamorado MD  [address]  [address]  [facility] [id number]    Patient: Tracy aKmara   YOB: 1950   Date of Visit: 1/19/2023       Dear Herman Enamorado MD    Tracy Kamara was in my office today. Below is a copy of my note.    If you have questions, please do not hesitate to call me. I look forward to following Tracy along with you.         Sincerely,        JET Marroquin        CC: No Recipients    Chief Complaint   Patient presents with   • Follow-up     Cardiac management   • Lab     Last labs in chart.   • Blood pressure     Has been good, average 120/75 at home.   • Med Refill     Needs refills on Lisinopril and Metoprolol-90 day       Subjective       Tracy Kamara is a 72 y.o. female with hypertension, hyperlipidemia and strong family history of IHD. She was referred in May 2018 for her initial cardiac evaluation secondary to CP and dizziness. Carotid US normal. Stress and echo showed normal EF and no ischemia.  Atenolol changed to Coreg secondary to increased chronotropic response.  She developed myalgia with higher dose pravastatin which was reduced to 20 mg then later she stopped. D-dimer secondary to LLE edema elevated at 2.03 with negative US, popliteal cyst noted. Coreg changed to metoprolol, lisinopril changed to losartan for persistent congestion and cough. Symptoms improved.     She returns today for yearly follow-up.  She denies cardiac symptoms.  No chest pain, shortness of breath, palpitations or dizziness.  She began having leg cramps after taking pravastatin for 6 months or so and discontinued.  Labs on 1/14/2023 showed A1c 5.7%, TSH 1.34, , , HDL 51, , glucose 98, normal CMP, normal CBC.        Cardiac History:    Past Surgical History:   Procedure Laterality Date   • CARDIOVASCULAR STRESS TEST  05/03/2018    6 MIn, 30 Secs. 7.0 METS. 107% THR. BP- 149/70. Breast Attenuation   • ECHO - CONVERTED  05/03/2018     EF 65%. RVSP- 46 mmHg   • US CAROTID UNILATERAL  11/10/2015    @ [facility]. Normal Study     Current Outpatient Medications   Medication Sig Dispense Refill   • aspirin 81 MG EC tablet Take 81 mg by mouth Daily.     • losartan (Cozaar) 25 MG tablet Take 1 tablet by mouth Daily. Stop lisinopril 90 tablet 4   • metoprolol succinate XL (TOPROL-XL) 50 MG 24 hr tablet Take 1 tablet by mouth Daily. Stop Coreg 90 tablet 4   • Multiple Vitamins-Minerals (MULTIVITAMIN WITH MINERALS) tablet tablet Take 1 tablet by mouth Daily.     • ezetimibe (ZETIA) 10 MG tablet Take 1 tablet by mouth Daily. 90 tablet 4     No current facility-administered medications for this visit.     Pravastatin and Tape    Past Medical History:   Diagnosis Date   • Asthma     Was told she has had a child   • Diverticulitis    • History of hysterectomy    • Hx of cholecystectomy    • Hx of tonsillectomy    • Hyperlipidemia    • Hypertension      Social History     Socioeconomic History   • Marital status:    Tobacco Use   • Smoking status: Former     Types: Cigarettes     Quit date: 1992     Years since quittin.7   • Smokeless tobacco: Never   Vaping Use   • Vaping Use: Never used   Substance and Sexual Activity   • Alcohol use: Not Currently     Comment: rarely, maybe once a year.   • Drug use: No   • Sexual activity: Defer     Family History   Problem Relation Age of Onset   • Hypertension Mother    • Diabetes Mother    • Heart attack Father    • Hyperlipidemia Father    • Hypertension Father      Review of Systems   Constitutional: Negative for decreased appetite and malaise/fatigue.   HENT: Negative.    Eyes: Negative for blurred vision.   Cardiovascular: Negative for chest pain, dyspnea on exertion, leg swelling, palpitations and syncope.   Respiratory: Negative for shortness of breath and sleep disturbances due to breathing.    Endocrine: Negative.    Hematologic/Lymphatic: Negative for bleeding problem. Does not bruise/bleed easily.  "  Skin: Negative.    Musculoskeletal: Negative for falls and myalgias.   Gastrointestinal: Negative for abdominal pain, heartburn and melena.   Genitourinary: Negative for hematuria.   Neurological: Negative for dizziness and light-headedness.   Psychiatric/Behavioral: Negative for altered mental status.   Allergic/Immunologic: Negative.       Objective      /80 (BP Location: Right arm)   Pulse 60   Ht 165.1 cm (65\")   Wt 70.4 kg (155 lb 3.2 oz)   BMI 25.83 kg/m²     Vitals and nursing note reviewed.   Constitutional:       General: Not in acute distress.     Appearance: Well-developed. Not diaphoretic.   Eyes:      Pupils: Pupils are equal, round, and reactive to light.   HENT:      Head: Normocephalic.   Pulmonary:      Effort: Pulmonary effort is normal. No respiratory distress.      Breath sounds: Normal breath sounds.   Cardiovascular:      Normal rate. Regular rhythm.   Pulses:     Intact distal pulses.   Abdominal:      General: Bowel sounds are normal.      Palpations: Abdomen is soft.   Musculoskeletal: Normal range of motion.      Cervical back: Normal range of motion. Skin:     General: Skin is warm and dry.   Neurological:      Mental Status: Alert and oriented to person, place, and time.        Procedures         Problem List Items Addressed This Visit        Cardiac and Vasculature    Essential hypertension - Primary    Relevant Medications    losartan (Cozaar) 25 MG tablet    metoprolol succinate XL (TOPROL-XL) 50 MG 24 hr tablet    Other Relevant Orders    Stress Test With Myocardial Perfusion One Day    Adult Transthoracic Echo Complete W/ Cont if Necessary Per Protocol    Hypercholesteremia    Relevant Medications    ezetimibe (ZETIA) 10 MG tablet    Other Relevant Orders    Stress Test With Myocardial Perfusion One Day    Adult Transthoracic Echo Complete W/ Cont if Necessary Per Protocol       Pulmonary and Pneumonias    Shortness of breath    Relevant Orders    Stress Test With " Myocardial Perfusion One Day    Adult Transthoracic Echo Complete W/ Cont if Necessary Per Protocol   Other Visit Diagnoses     Pulmonary hypertension (HCC)        Relevant Orders    Adult Transthoracic Echo Complete W/ Cont if Necessary Per Protocol    Nonrheumatic mitral valve regurgitation        Relevant Medications    metoprolol succinate XL (TOPROL-XL) 50 MG 24 hr tablet    Other Relevant Orders    Adult Transthoracic Echo Complete W/ Cont if Necessary Per Protocol         1.  HTN-well controlled, monitored at home. Continue losartan and metoprolol. Refill sent.     2.  Hyperlipidemia-did not tolerate pravastatin. LDL elevated at 189. She declines statin, PCSK9. Agreed to try Zetia 10 mg daily. Continue heart healthy diet.     3.  L LE swelling-improved, presence of Baker's cyst noted.     4. Repeat cardiac work up with stress test and echocardiogram to evaluate exercise tolerance, blood pressure response, LVEF, mitral regurg, PA pressure. Risk factors include elevated lipids, strong family hx and HTN.     She requested testing scheduled at [facility] due to insurance, PPO.     Further recommendations to follow stress and echo.  No changes made.  Continue yearly follow up unless new symptoms develop.     BMI is >= 25 and <30. (Overweight) The following options were offered after discussion;: nutrition counseling/recommendations              Electronically signed by JET Marroquin,  January 23, 2023 11:29 EST

## 2023-01-19 NOTE — PROGRESS NOTES
Chief Complaint   Patient presents with   • Follow-up     Cardiac management   • Lab     Last labs in chart.   • Blood pressure     Has been good, average 120/75 at home.   • Med Refill     Needs refills on Lisinopril and Metoprolol-90 day       Subjective       Tracy Kamara is a 72 y.o. female with hypertension, hyperlipidemia and strong family history of IHD. She was referred in May 2018 for her initial cardiac evaluation secondary to CP and dizziness. Carotid US normal. Stress and echo showed normal EF and no ischemia.  Atenolol changed to Coreg secondary to increased chronotropic response.  She developed myalgia with higher dose pravastatin which was reduced to 20 mg then later she stopped. D-dimer secondary to LLE edema elevated at 2.03 with negative US, popliteal cyst noted. Coreg changed to metoprolol, lisinopril changed to losartan for persistent congestion and cough. Symptoms improved.     She returns today for yearly follow-up.  She denies cardiac symptoms.  No chest pain, shortness of breath, palpitations or dizziness.  She began having leg cramps after taking pravastatin for 6 months or so and discontinued.  Labs on 1/14/2023 showed A1c 5.7%, TSH 1.34, , , HDL 51, , glucose 98, normal CMP, normal CBC.         Cardiac History:    Past Surgical History:   Procedure Laterality Date   • CARDIOVASCULAR STRESS TEST  05/03/2018    6 MIn, 30 Secs. 7.0 METS. 107% THR. BP- 149/70. Breast Attenuation   • ECHO - CONVERTED  05/03/2018    EF 65%. RVSP- 46 mmHg   • US CAROTID UNILATERAL  11/10/2015    @ [facility]. Normal Study     Current Outpatient Medications   Medication Sig Dispense Refill   • aspirin 81 MG EC tablet Take 81 mg by mouth Daily.     • losartan (Cozaar) 25 MG tablet Take 1 tablet by mouth Daily. Stop lisinopril 90 tablet 4   • metoprolol succinate XL (TOPROL-XL) 50 MG 24 hr tablet Take 1 tablet by mouth Daily. Stop Coreg 90 tablet 4   • Multiple Vitamins-Minerals (MULTIVITAMIN WITH  "MINERALS) tablet tablet Take 1 tablet by mouth Daily.     • ezetimibe (ZETIA) 10 MG tablet Take 1 tablet by mouth Daily. 90 tablet 4     No current facility-administered medications for this visit.     Pravastatin and Tape    Past Medical History:   Diagnosis Date   • Asthma     Was told she has had a child   • Diverticulitis    • History of hysterectomy    • Hx of cholecystectomy    • Hx of tonsillectomy    • Hyperlipidemia    • Hypertension      Social History     Socioeconomic History   • Marital status:    Tobacco Use   • Smoking status: Former     Types: Cigarettes     Quit date: 1992     Years since quittin.7   • Smokeless tobacco: Never   Vaping Use   • Vaping Use: Never used   Substance and Sexual Activity   • Alcohol use: Not Currently     Comment: rarely, maybe once a year.   • Drug use: No   • Sexual activity: Defer     Family History   Problem Relation Age of Onset   • Hypertension Mother    • Diabetes Mother    • Heart attack Father    • Hyperlipidemia Father    • Hypertension Father      Review of Systems   Constitutional: Negative for decreased appetite and malaise/fatigue.   HENT: Negative.    Eyes: Negative for blurred vision.   Cardiovascular: Negative for chest pain, dyspnea on exertion, leg swelling, palpitations and syncope.   Respiratory: Negative for shortness of breath and sleep disturbances due to breathing.    Endocrine: Negative.    Hematologic/Lymphatic: Negative for bleeding problem. Does not bruise/bleed easily.   Skin: Negative.    Musculoskeletal: Negative for falls and myalgias.   Gastrointestinal: Negative for abdominal pain, heartburn and melena.   Genitourinary: Negative for hematuria.   Neurological: Negative for dizziness and light-headedness.   Psychiatric/Behavioral: Negative for altered mental status.   Allergic/Immunologic: Negative.       Objective     /80 (BP Location: Right arm)   Pulse 60   Ht 165.1 cm (65\")   Wt 70.4 kg (155 lb 3.2 oz)   BMI " 25.83 kg/m²     Vitals and nursing note reviewed.   Constitutional:       General: Not in acute distress.     Appearance: Well-developed. Not diaphoretic.   Eyes:      Pupils: Pupils are equal, round, and reactive to light.   HENT:      Head: Normocephalic.   Pulmonary:      Effort: Pulmonary effort is normal. No respiratory distress.      Breath sounds: Normal breath sounds.   Cardiovascular:      Normal rate. Regular rhythm.   Pulses:     Intact distal pulses.   Abdominal:      General: Bowel sounds are normal.      Palpations: Abdomen is soft.   Musculoskeletal: Normal range of motion.      Cervical back: Normal range of motion. Skin:     General: Skin is warm and dry.   Neurological:      Mental Status: Alert and oriented to person, place, and time.        Procedures          Problem List Items Addressed This Visit        Cardiac and Vasculature    Essential hypertension - Primary    Relevant Medications    losartan (Cozaar) 25 MG tablet    metoprolol succinate XL (TOPROL-XL) 50 MG 24 hr tablet    Other Relevant Orders    Stress Test With Myocardial Perfusion One Day    Adult Transthoracic Echo Complete W/ Cont if Necessary Per Protocol    Hypercholesteremia    Relevant Medications    ezetimibe (ZETIA) 10 MG tablet    Other Relevant Orders    Stress Test With Myocardial Perfusion One Day    Adult Transthoracic Echo Complete W/ Cont if Necessary Per Protocol       Pulmonary and Pneumonias    Shortness of breath    Relevant Orders    Stress Test With Myocardial Perfusion One Day    Adult Transthoracic Echo Complete W/ Cont if Necessary Per Protocol   Other Visit Diagnoses     Pulmonary hypertension (HCC)        Relevant Orders    Adult Transthoracic Echo Complete W/ Cont if Necessary Per Protocol    Nonrheumatic mitral valve regurgitation        Relevant Medications    metoprolol succinate XL (TOPROL-XL) 50 MG 24 hr tablet    Other Relevant Orders    Adult Transthoracic Echo Complete W/ Cont if Necessary Per  Protocol         1.  HTN-well controlled, monitored at home. Continue losartan and metoprolol. Refill sent.     2.  Hyperlipidemia-did not tolerate pravastatin. LDL elevated at 189. She declines statin, PCSK9. Agreed to try Zetia 10 mg daily. Continue heart healthy diet.     3.  L LE swelling-improved, presence of Baker's cyst noted.     4. Repeat cardiac work up with stress test and echocardiogram to evaluate exercise tolerance, blood pressure response, LVEF, mitral regurg, PA pressure. Risk factors include elevated lipids, strong family hx and HTN.     She requested testing scheduled at [facility] due to insurance, PPO.     Further recommendations to follow stress and echo.  No changes made.  Continue yearly follow up unless new symptoms develop.     BMI is >= 25 and <30. (Overweight) The following options were offered after discussion;: nutrition counseling/recommendations               Electronically signed by JET Marroquin,  January 23, 2023 11:29 EST

## 2023-01-26 ENCOUNTER — TELEPHONE (OUTPATIENT)
Dept: CARDIOLOGY | Facility: CLINIC | Age: 73
End: 2023-01-26
Payer: MEDICARE

## 2023-01-26 RX ORDER — METOPROLOL SUCCINATE 50 MG/1
50 TABLET, EXTENDED RELEASE ORAL DAILY
Qty: 30 TABLET | Refills: 0 | Status: SHIPPED | OUTPATIENT
Start: 2023-01-26

## 2023-03-20 ENCOUNTER — TELEPHONE (OUTPATIENT)
Dept: CARDIOLOGY | Facility: CLINIC | Age: 73
End: 2023-03-20
Payer: MEDICARE

## 2023-03-20 DIAGNOSIS — E78.00 HYPERCHOLESTEREMIA: ICD-10-CM

## 2023-03-20 DIAGNOSIS — I27.20 PULMONARY HYPERTENSION: ICD-10-CM

## 2023-03-20 DIAGNOSIS — R06.02 SHORTNESS OF BREATH: ICD-10-CM

## 2023-03-20 DIAGNOSIS — I10 ESSENTIAL HYPERTENSION: Primary | ICD-10-CM

## 2023-03-20 NOTE — TELEPHONE ENCOUNTER
Patient called concerned with cost of her nuclear stress test copay.  (It is scheduled at [facility]).  Patient asking if she could change to just a treadmill stress test.  She states she spoke with her insurance and she cannot afford the nuclear stress copay.  I explained to her the difference between a treadmill stress and nuclear stress test.

## 2023-04-18 PROCEDURE — 93306 TTE W/DOPPLER COMPLETE: CPT | Performed by: INTERNAL MEDICINE

## 2023-04-19 ENCOUNTER — OUTSIDE FACILITY SERVICE (OUTPATIENT)
Dept: CARDIOLOGY | Facility: CLINIC | Age: 73
End: 2023-04-19
Payer: MEDICARE

## 2023-04-19 PROCEDURE — 93018 CV STRESS TEST I&R ONLY: CPT | Performed by: INTERNAL MEDICINE

## 2024-03-18 RX ORDER — EZETIMIBE 10 MG/1
10 TABLET ORAL DAILY
Qty: 90 TABLET | Refills: 3 | OUTPATIENT
Start: 2024-03-18

## 2024-03-18 RX ORDER — LOSARTAN POTASSIUM 25 MG/1
25 TABLET ORAL DAILY
Qty: 90 TABLET | Refills: 3 | OUTPATIENT
Start: 2024-03-18

## 2024-03-18 RX ORDER — METOPROLOL SUCCINATE 50 MG/1
50 TABLET, EXTENDED RELEASE ORAL DAILY
Qty: 90 TABLET | Refills: 3 | OUTPATIENT
Start: 2024-03-18